# Patient Record
Sex: MALE | Race: WHITE | NOT HISPANIC OR LATINO | Employment: OTHER | URBAN - METROPOLITAN AREA
[De-identification: names, ages, dates, MRNs, and addresses within clinical notes are randomized per-mention and may not be internally consistent; named-entity substitution may affect disease eponyms.]

---

## 2017-12-05 NOTE — PATIENT DISCUSSION
Patient educated eyes very dry today. Increase tear use several times a day. Will send to Dr. Eloy More for possible punctal plugs.

## 2017-12-05 NOTE — PATIENT DISCUSSION
AMD remains persistent but without progression. Continue with Amsler grid and AREDS 2. Avoid direct or indirect smoking. The possibility of progression was discussed.

## 2017-12-05 NOTE — PATIENT DISCUSSION
Tano #13 injection recommended today after discussion of benefits, risks, alternatives. The injection was administered without complication. Post-injection instructions were reviewed and understood by the patient.

## 2017-12-05 NOTE — PROCEDURE NOTE: CLINICAL
PROCEDURE NOTE: Eylea #13 OD. Diagnosis: Neovascular AMD with Active CNV. Prep: Betadine Drops and Betadine Scrub. Prior to injection, risks/benefits/alternatives discussed including corneal abrasion, infection, loss of vision, hemorrhage, cataract, glaucoma, retinal tears or detachment. A written consent is on file, and the need for today’s injection was discussed and the patient is understanding and wishes to proceed. The entire vial of Eylea was drawn up into a syringe. The opened vial, remaining drug, and filtered needle were disposed of in a certified biohazard container. Betadine prep was performed. Topical anesthesia was induced with Alcaine. 4% lidocaine pledge. A lid speculum was used. A short 30g needle on a 1cc syringe was used with product that that had previously been prepared under sterile conditions. Injection site: 3-4 mm from the limbus. The used syringe/needle was transferred to a biohazard container. Lid speculum removed. Mask worn during procedure. Patient tolerated procedure well. Count fingers vision was verified. There were no complications. Patient was given the standard instruction sheet. Patient given office phone number/answering service number and advised to call immediately should there be loss of vision or pain, or should they have any other questions or concerns. Lola Umanzor

## 2018-02-08 NOTE — PROCEDURE NOTE: CLINICAL
PROCEDURE NOTE: Eylea #14 OD. Diagnosis: Neovascular AMD with Active CNV. Prep: Betadine Drops and Betadine Scrub. Prior to injection, risks/benefits/alternatives discussed including corneal abrasion, infection, loss of vision, hemorrhage, cataract, glaucoma, retinal tears or detachment. A written consent is on file, and the need for today’s injection was discussed and the patient is understanding and wishes to proceed. The entire vial of Eylea was drawn up into a syringe. The opened vial, remaining drug, and filtered needle were disposed of in a certified biohazard container. Betadine prep was performed. Topical anesthesia was induced with Alcaine. 4% lidocaine pledge. A lid speculum was used. A short 30g needle on a 1cc syringe was used with product that that had previously been prepared under sterile conditions. Injection site: 3-4 mm from the limbus. The used syringe/needle was transferred to a biohazard container. Lid speculum removed. Mask worn during procedure. Patient tolerated procedure well. Count fingers vision was verified. There were no complications. Patient was given the standard instruction sheet. Patient given office phone number/answering service number and advised to call immediately should there be loss of vision or pain, or should they have any other questions or concerns. Lo Claros

## 2018-02-08 NOTE — PATIENT DISCUSSION
Patient educated eyes very dry today. Increase tear use several times a day. Will send to Dr. Ayo Gonzalez for possible punctal plugs.

## 2018-03-27 NOTE — PROCEDURE NOTE: CLINICAL
PROCEDURE NOTE: Eylea #15 OD. Diagnosis: Neovascular AMD with Active CNV. Prior to injection, risks/benefits/alternatives discussed including corneal abrasion, infection, loss of vision, hemorrhage, cataract, glaucoma, retinal tears or detachment. A written consent is on file, and the need for today’s injection was discussed and the patient is understanding and wishes to proceed. The entire vial of Eylea was drawn up into a syringe. The opened vial, remaining drug, and filtered needle were disposed of in a certified biohazard container. Betadine prep was performed. Topical anesthesia was induced with Alcaine. 4% lidocaine pledge. A lid speculum was used. A short 30g needle on a 1cc syringe was used with product that that had previously been prepared under sterile conditions. Injection site: 3-4 mm from the limbus. The used syringe/needle was transferred to a biohazard container. Lid speculum removed. Mask worn during procedure. Patient tolerated procedure well. Count fingers vision was verified. There were no complications. Patient was given the standard instruction sheet. Patient given office phone number/answering service number and advised to call immediately should there be loss of vision or pain, or should they have any other questions or concerns. Renetta Browning

## 2018-03-27 NOTE — PATIENT DISCUSSION
Betzaidaa #15 injection recommended today after discussion of benefits, risks, alternatives. The injection was administered without complication. Post-injection instructions were reviewed and understood by the patient.

## 2018-03-27 NOTE — PATIENT DISCUSSION
Patient educated eyes very dry today. Increase tear use several times a day. Will send to Dr. Ai Luna for possible punctal plugs.

## 2018-05-16 NOTE — PATIENT DISCUSSION
Advised to call immediately if any worsening distortion or blurring is noted.  S/S of endophthalmitis discussed with patient, patient to call if any change in vision or sign of endophthalmitis.

## 2018-05-16 NOTE — PATIENT DISCUSSION
Stable, no new intraretinal or subretinal fluid on spectral domain OCT today.  Recommend treatment today. Goal is to maintain current level of vision.  Extend to 8 weeks.

## 2018-05-16 NOTE — PATIENT DISCUSSION
Patient educated eyes very dry today. Increase tear use several times a day. Will send to Dr. Jenise Knowles for possible punctal plugs.

## 2018-05-16 NOTE — PATIENT DISCUSSION
Tano #16 injection recommended today after discussion of benefits, risks, alternatives. The injection was administered without complication. Post-injection instructions were reviewed and understood by the patient.

## 2018-05-16 NOTE — PROCEDURE NOTE: CLINICAL
PROCEDURE NOTE: Eylea #16 OD. Diagnosis: Neovascular AMD with Active CNV. Prior to injection, risks/benefits/alternatives discussed including corneal abrasion, infection, loss of vision, hemorrhage, cataract, glaucoma, retinal tears or detachment. A written consent is on file, and the need for today’s injection was discussed and the patient is understanding and wishes to proceed. The entire vial of Eylea was drawn up into a syringe. The opened vial, remaining drug, and filtered needle were disposed of in a certified biohazard container. Betadine prep was performed. Topical anesthesia was induced with Alcaine. 4% lidocaine pledge. A lid speculum was used. A short 30g needle on a 1cc syringe was used with product that that had previously been prepared under sterile conditions. Injection site: 3-4 mm from the limbus. The used syringe/needle was transferred to a biohazard container. Lid speculum removed. Mask worn during procedure. Patient tolerated procedure well. Count fingers vision was verified. There were no complications. Patient was given the standard instruction sheet. Patient given office phone number/answering service number and advised to call immediately should there be loss of vision or pain, or should they have any other questions or concerns. Rich Santiago

## 2018-07-11 NOTE — PATIENT DISCUSSION
Patient educated eyes very dry today. Increase tear use several times a day. Will send to Dr. Sharee Goltz for possible punctal plugs.

## 2018-07-11 NOTE — PROCEDURE NOTE: CLINICAL
PROCEDURE NOTE: Eylea #17 OD. Diagnosis: Neovascular AMD with Active CNV. Prior to injection, risks/benefits/alternatives discussed including corneal abrasion, infection, loss of vision, hemorrhage, cataract, glaucoma, retinal tears or detachment. A written consent is on file, and the need for today’s injection was discussed and the patient is understanding and wishes to proceed. The entire vial of Eylea was drawn up into a syringe. The opened vial, remaining drug, and filtered needle were disposed of in a certified biohazard container. Betadine prep was performed. Topical anesthesia was induced with Alcaine. 4% lidocaine pledge. A lid speculum was used. A short 30g needle on a 1cc syringe was used with product that that had previously been prepared under sterile conditions. Injection site: 3-4 mm from the limbus. The used syringe/needle was transferred to a biohazard container. Lid speculum removed. Mask worn during procedure. Patient tolerated procedure well. Count fingers vision was verified. There were no complications. Patient was given the standard instruction sheet. Patient given office phone number/answering service number and advised to call immediately should there be loss of vision or pain, or should they have any other questions or concerns. Otoniel Bower

## 2018-07-11 NOTE — PATIENT DISCUSSION
Stable, no new intraretinal or subretinal fluid on spectral domain OCT today.  Recommend treatment today. Goal is to maintain current level of vision.  Extend to 8-9 weeks.

## 2018-07-11 NOTE — PATIENT DISCUSSION
Tano #17 injection recommended today after discussion of benefits, risks, alternatives. The injection was administered without complication. Post-injection instructions were reviewed and understood by the patient.

## 2018-07-11 NOTE — PATIENT DISCUSSION
07/11/2018 (RETINA)- OCT/FA/ICG at the next visit. POSSIBLE Eylea injection at that time. Recommended continuing injection interval at 8-9 weeks.

## 2018-09-05 NOTE — PATIENT DISCUSSION
9/05/2018 (RETINA)- Treatment only at next visit in 2 weeks.  Then follow up in 9-10 weeks for poss JESU OD, OCT at that visit.

## 2018-09-05 NOTE — PATIENT DISCUSSION
Stable, no new intraretinal or subretinal fluid on spectral domain OCT today.  Recommend treatment. Goal is to maintain current level of vision.  Extend to 9-10 weeks. Patient to return in 2 weeks for treatment only.

## 2018-09-19 NOTE — PATIENT DISCUSSION
Stable, no new intraretinal or subretinal fluid on spectral domain OCT today.  Recommend treatment. Goal is to maintain current level of vision. Continue 9-10 weeks intervals.

## 2018-09-19 NOTE — PROCEDURE NOTE: CLINICAL
PROCEDURE NOTE: Eylea 2mg OD. Diagnosis: Neovascular AMD with Active CNV. Prior to injection, risks/benefits/alternatives discussed including corneal abrasion, infection, loss of vision, hemorrhage, cataract, glaucoma, retinal tears or detachment. A written consent is on file, and the need for today’s injection was discussed and the patient is understanding and wishes to proceed. The entire vial of Eylea was drawn up into a syringe. The opened vial, remaining drug, and filtered needle were disposed of in a certified biohazard container. Betadine prep was performed. Topical anesthesia was induced with Alcaine. 4% lidocaine pledge. A lid speculum was used. A short 30g needle on a 1cc syringe was used with product that that had previously been prepared under sterile conditions. Injection site: 3-4 mm from the limbus. The used syringe/needle was transferred to a biohazard container. Lid speculum removed. Mask worn during procedure. Patient tolerated procedure well. Count fingers vision was verified. There were no complications. Patient was given the standard instruction sheet. Patient given office phone number/answering service number and advised to call immediately should there be loss of vision or pain, or should they have any other questions or concerns. Rosas Smith

## 2018-11-28 NOTE — PROCEDURE NOTE: CLINICAL
PROCEDURE NOTE: Eylea 2mg OD. Diagnosis: Neovascular AMD with Active CNV. Prior to injection, risks/benefits/alternatives discussed including corneal abrasion, infection, loss of vision, hemorrhage, cataract, glaucoma, retinal tears or detachment. A written consent is on file, and the need for today’s injection was discussed and the patient is understanding and wishes to proceed. The entire vial of Eylea was drawn up into a syringe. The opened vial, remaining drug, and filtered needle were disposed of in a certified biohazard container. Betadine prep was performed. Topical anesthesia was induced with Alcaine. 4% lidocaine pledge. A lid speculum was used. A short 30g needle on a 1cc syringe was used with product that that had previously been prepared under sterile conditions. Injection site: 3-4 mm from the limbus. The used syringe/needle was transferred to a biohazard container. Lid speculum removed. Mask worn during procedure. Patient tolerated procedure well. Count fingers vision was verified. There were no complications. Patient was given the standard instruction sheet. Patient given office phone number/answering service number and advised to call immediately should there be loss of vision or pain, or should they have any other questions or concerns. Karina Salazar

## 2018-11-28 NOTE — PATIENT DISCUSSION
On today's exam no IRF/SRF was noted.  Will inject today and extend injection intervals to 11-12 weeks.

## 2019-02-12 NOTE — PATIENT DISCUSSION
Decision to treat was made based on today's clinical findings.  Stable, no SRF/IRF, treat today , possible repeat injection 11-12 weeks.  Goal of treatment is to maintain current level of vision.

## 2019-02-12 NOTE — PROCEDURE NOTE: CLINICAL
PROCEDURE NOTE: Eylea 2mg OD. Diagnosis: Neovascular AMD with Active CNV. Prior to injection, risks/benefits/alternatives discussed including corneal abrasion, infection, loss of vision, hemorrhage, cataract, glaucoma, retinal tears or detachment. A written consent is on file, and the need for today’s injection was discussed and the patient is understanding and wishes to proceed. The entire vial of Eylea was drawn up into a syringe. The opened vial, remaining drug, and filtered needle were disposed of in a certified biohazard container. Betadine prep was performed. Topical anesthesia was induced with Alcaine. 4% lidocaine pledge. A lid speculum was used. A short 30g needle on a 1cc syringe was used with product that that had previously been prepared under sterile conditions. Injection site: 3-4 mm from the limbus. The used syringe/needle was transferred to a biohazard container. Lid speculum removed. Mask worn during procedure. Patient tolerated procedure well. Count fingers vision was verified. There were no complications. Patient was given the standard instruction sheet. Patient given office phone number/answering service number and advised to call immediately should there be loss of vision or pain, or should they have any other questions or concerns. Bonnie Laws

## 2019-05-01 NOTE — PATIENT DISCUSSION
Decision to treat was made based on today's clinical findings. No SRF/IRF, treat today , possible repeat injection 12 weeks.  Goal of treatment is to maintain current level of vision.

## 2019-05-01 NOTE — PROCEDURE NOTE: CLINICAL
PROCEDURE NOTE: Eylea 2mg OD. Diagnosis: Neovascular AMD with Active CNV. Prior to injection, risks/benefits/alternatives discussed including corneal abrasion, infection, loss of vision, hemorrhage, cataract, glaucoma, retinal tears or detachment. A written consent is on file, and the need for today’s injection was discussed and the patient is understanding and wishes to proceed. The entire vial of Eylea was drawn up into a syringe. The opened vial, remaining drug, and filtered needle were disposed of in a certified biohazard container. Betadine prep was performed. Topical anesthesia was induced with Alcaine. 4% lidocaine pledge. A lid speculum was used. A short 30g needle on a 1cc syringe was used with product that that had previously been prepared under sterile conditions. Injection site: 3-4 mm from the limbus. The used syringe/needle was transferred to a biohazard container. Lid speculum removed. Mask worn during procedure. Patient tolerated procedure well. Count fingers vision was verified. There were no complications. Patient was given the standard instruction sheet. Patient given office phone number/answering service number and advised to call immediately should there be loss of vision or pain, or should they have any other questions or concerns. Franky Dan

## 2020-03-12 ENCOUNTER — NEW PATIENT COMPREHENSIVE (OUTPATIENT)
Dept: URBAN - METROPOLITAN AREA CLINIC 39 | Facility: CLINIC | Age: 70
End: 2020-03-12

## 2020-03-12 DIAGNOSIS — H25.812: ICD-10-CM

## 2020-03-12 DIAGNOSIS — H25.811: ICD-10-CM

## 2020-03-12 DIAGNOSIS — H04.123: ICD-10-CM

## 2020-03-12 DIAGNOSIS — H02.831: ICD-10-CM

## 2020-03-12 DIAGNOSIS — H02.885: ICD-10-CM

## 2020-03-12 DIAGNOSIS — H02.834: ICD-10-CM

## 2020-03-12 DIAGNOSIS — H02.882: ICD-10-CM

## 2020-03-12 PROCEDURE — 92015 DETERMINE REFRACTIVE STATE: CPT

## 2020-03-12 PROCEDURE — 92004 COMPRE OPH EXAM NEW PT 1/>: CPT

## 2020-03-12 ASSESSMENT — TONOMETRY
OD_IOP_MMHG: 14
OS_IOP_MMHG: 14

## 2020-03-12 ASSESSMENT — VISUAL ACUITY
OS_SC: 20/30-1
OD_SC: 20/30+1
OS_CC: J1-
OD_SC: J12
OD_CC: J1-
OS_SC: J12

## 2021-03-12 ENCOUNTER — ESTABLISHED COMPREHENSIVE EXAM (OUTPATIENT)
Dept: URBAN - METROPOLITAN AREA CLINIC 39 | Facility: CLINIC | Age: 71
End: 2021-03-12

## 2021-03-12 DIAGNOSIS — H52.4: ICD-10-CM

## 2021-03-12 DIAGNOSIS — H43.813: ICD-10-CM

## 2021-03-12 DIAGNOSIS — H04.123: ICD-10-CM

## 2021-03-12 DIAGNOSIS — H02.834: ICD-10-CM

## 2021-03-12 DIAGNOSIS — H02.831: ICD-10-CM

## 2021-03-12 DIAGNOSIS — H52.03: ICD-10-CM

## 2021-03-12 DIAGNOSIS — H02.882: ICD-10-CM

## 2021-03-12 DIAGNOSIS — H25.811: ICD-10-CM

## 2021-03-12 DIAGNOSIS — H02.885: ICD-10-CM

## 2021-03-12 DIAGNOSIS — H25.812: ICD-10-CM

## 2021-03-12 DIAGNOSIS — H52.203: ICD-10-CM

## 2021-03-12 PROCEDURE — 92015 DETERMINE REFRACTIVE STATE: CPT

## 2021-03-12 PROCEDURE — 92014 COMPRE OPH EXAM EST PT 1/>: CPT

## 2021-03-12 PROCEDURE — 92499OP2 OPTOMAP RETINAL SCREENING BOTH EYES

## 2021-03-12 ASSESSMENT — KERATOMETRY
OD_K2POWER_DIOPTERS: 43.25
OS_AXISANGLE2_DEGREES: 83
OD_K1POWER_DIOPTERS: 43.00
OS_K1POWER_DIOPTERS: 43.25
OS_K2POWER_DIOPTERS: 43.75
OS_AXISANGLE_DEGREES: 173
OD_AXISANGLE_DEGREES: 28
OD_AXISANGLE2_DEGREES: 118

## 2021-03-12 ASSESSMENT — TONOMETRY
OD_IOP_MMHG: 14
OS_IOP_MMHG: 14

## 2021-03-12 ASSESSMENT — VISUAL ACUITY
OD_BAT: 20/40 W/MR
OU_SC: 20/25+2
OS_CC: J3
OD_SC: <J12
OU_CC: J1
OS_SC: 20/30-2
OS_SC: <J12
OS_BAT: 20/40 W/MR
OD_SC: 20/20-2
OS_CC: 20/50-2
OD_CC: J2 PALS
OU_CC: 20/30+2